# Patient Record
Sex: MALE | Employment: FULL TIME | URBAN - METROPOLITAN AREA
[De-identification: names, ages, dates, MRNs, and addresses within clinical notes are randomized per-mention and may not be internally consistent; named-entity substitution may affect disease eponyms.]

---

## 2022-01-31 ENCOUNTER — OFFICE VISIT (OUTPATIENT)
Dept: FAMILY MEDICINE | Facility: CLINIC | Age: 27
End: 2022-01-31
Payer: COMMERCIAL

## 2022-01-31 ENCOUNTER — CLINICAL SUPPORT (OUTPATIENT)
Dept: FAMILY MEDICINE | Facility: CLINIC | Age: 27
End: 2022-01-31
Payer: COMMERCIAL

## 2022-01-31 VITALS
SYSTOLIC BLOOD PRESSURE: 130 MMHG | HEART RATE: 70 BPM | BODY MASS INDEX: 27.7 KG/M2 | DIASTOLIC BLOOD PRESSURE: 82 MMHG | OXYGEN SATURATION: 99 % | HEIGHT: 66 IN | WEIGHT: 172.38 LBS | RESPIRATION RATE: 20 BRPM

## 2022-01-31 DIAGNOSIS — R79.89 ABNORMAL LEVEL OF FEMALE SEX HORMONES: ICD-10-CM

## 2022-01-31 DIAGNOSIS — Z87.718 HISTORY OF RENAL ANOMALY: ICD-10-CM

## 2022-01-31 DIAGNOSIS — R79.89 ABNORMAL LEVEL OF FEMALE SEX HORMONES: Primary | ICD-10-CM

## 2022-01-31 LAB
ALBUMIN SERPL BCP-MCNC: 4.6 G/DL (ref 3.5–5.2)
ALP SERPL-CCNC: 44 U/L (ref 55–135)
ALT SERPL W/O P-5'-P-CCNC: 18 U/L (ref 10–44)
ANION GAP SERPL CALC-SCNC: 9 MMOL/L (ref 8–16)
AST SERPL-CCNC: 19 U/L (ref 10–40)
BASOPHILS # BLD AUTO: 0.05 K/UL (ref 0–0.2)
BASOPHILS NFR BLD: 0.8 % (ref 0–1.9)
BILIRUB SERPL-MCNC: 0.6 MG/DL (ref 0.1–1)
BUN SERPL-MCNC: 16 MG/DL (ref 6–20)
CALCIUM SERPL-MCNC: 9.6 MG/DL (ref 8.7–10.5)
CHLORIDE SERPL-SCNC: 103 MMOL/L (ref 95–110)
CO2 SERPL-SCNC: 30 MMOL/L (ref 23–29)
CREAT SERPL-MCNC: 1 MG/DL (ref 0.5–1.4)
DIFFERENTIAL METHOD: ABNORMAL
EOSINOPHIL # BLD AUTO: 0.2 K/UL (ref 0–0.5)
EOSINOPHIL NFR BLD: 3.2 % (ref 0–8)
ERYTHROCYTE [DISTWIDTH] IN BLOOD BY AUTOMATED COUNT: 12.3 % (ref 11.5–14.5)
EST. GFR  (AFRICAN AMERICAN): >60 ML/MIN/1.73 M^2
EST. GFR  (NON AFRICAN AMERICAN): >60 ML/MIN/1.73 M^2
ESTRADIOL SERPL-MCNC: <10 PG/ML (ref 11–44)
FSH SERPL-ACNC: 5.43 MIU/ML (ref 0.95–11.95)
GLUCOSE SERPL-MCNC: 105 MG/DL (ref 70–110)
HCT VFR BLD AUTO: 45.8 % (ref 40–54)
HGB BLD-MCNC: 14.9 G/DL (ref 14–18)
IMM GRANULOCYTES # BLD AUTO: 0.01 K/UL (ref 0–0.04)
IMM GRANULOCYTES NFR BLD AUTO: 0.2 % (ref 0–0.5)
LH SERPL-ACNC: 2.8 MIU/ML (ref 0.6–12.1)
LYMPHOCYTES # BLD AUTO: 2 K/UL (ref 1–4.8)
LYMPHOCYTES NFR BLD: 31 % (ref 18–48)
MCH RBC QN AUTO: 31.2 PG (ref 27–31)
MCHC RBC AUTO-ENTMCNC: 32.5 G/DL (ref 32–36)
MCV RBC AUTO: 96 FL (ref 82–98)
MONOCYTES # BLD AUTO: 0.6 K/UL (ref 0.3–1)
MONOCYTES NFR BLD: 9.2 % (ref 4–15)
NEUTROPHILS # BLD AUTO: 3.5 K/UL (ref 1.8–7.7)
NEUTROPHILS NFR BLD: 55.6 % (ref 38–73)
NRBC BLD-RTO: 0 /100 WBC
PLATELET # BLD AUTO: 289 K/UL (ref 150–450)
PMV BLD AUTO: 9.3 FL (ref 9.2–12.9)
POTASSIUM SERPL-SCNC: 4.6 MMOL/L (ref 3.5–5.1)
PROT SERPL-MCNC: 7.3 G/DL (ref 6–8.4)
RBC # BLD AUTO: 4.77 M/UL (ref 4.6–6.2)
SODIUM SERPL-SCNC: 142 MMOL/L (ref 136–145)
TSH SERPL DL<=0.005 MIU/L-ACNC: 2.9 UIU/ML (ref 0.4–4)
WBC # BLD AUTO: 6.3 K/UL (ref 3.9–12.7)

## 2022-01-31 PROCEDURE — 84443 ASSAY THYROID STIM HORMONE: CPT | Performed by: FAMILY MEDICINE

## 2022-01-31 PROCEDURE — 99999 PR PBB SHADOW E&M-NEW PATIENT-LVL III: ICD-10-PCS | Mod: PBBFAC,,, | Performed by: FAMILY MEDICINE

## 2022-01-31 PROCEDURE — 99999 PR PBB SHADOW E&M-NEW PATIENT-LVL III: CPT | Mod: PBBFAC,,, | Performed by: FAMILY MEDICINE

## 2022-01-31 PROCEDURE — 83002 ASSAY OF GONADOTROPIN (LH): CPT | Performed by: FAMILY MEDICINE

## 2022-01-31 PROCEDURE — 85025 COMPLETE CBC W/AUTO DIFF WBC: CPT | Performed by: FAMILY MEDICINE

## 2022-01-31 PROCEDURE — 82670 ASSAY OF TOTAL ESTRADIOL: CPT | Performed by: FAMILY MEDICINE

## 2022-01-31 PROCEDURE — 99204 OFFICE O/P NEW MOD 45 MIN: CPT | Mod: S$GLB,,, | Performed by: FAMILY MEDICINE

## 2022-01-31 PROCEDURE — 36415 COLL VENOUS BLD VENIPUNCTURE: CPT

## 2022-01-31 PROCEDURE — 99204 PR OFFICE/OUTPT VISIT, NEW, LEVL IV, 45-59 MIN: ICD-10-PCS | Mod: S$GLB,,, | Performed by: FAMILY MEDICINE

## 2022-01-31 PROCEDURE — 80053 COMPREHEN METABOLIC PANEL: CPT | Performed by: FAMILY MEDICINE

## 2022-01-31 PROCEDURE — 83001 ASSAY OF GONADOTROPIN (FSH): CPT | Performed by: FAMILY MEDICINE

## 2022-01-31 PROCEDURE — 84402 ASSAY OF FREE TESTOSTERONE: CPT | Performed by: FAMILY MEDICINE

## 2022-01-31 NOTE — PROGRESS NOTES
Subjective:       Patient ID: Ludin Crabtree is a 27 y.o. male.    Chief Complaint: Fatigue (Concerned about hormone levels.)    Pt is a 27 y.o. male who presents for evaluation and management of   Encounter Diagnoses   Name Primary?    Estradiol excess Yes    History of renal anomaly    .pt comes to me as NP c/o fatigue and difficulty keeping muscle on despite working out and eating a high protein diet. He took it upon himself to do an online saliva test to test some hormone levels. His cortisol, DHEA, and testosterone levels were WNL for the lab reference range, but estradiol was elevated.   He takes multivitamin and a few other vitamins. Other than a high protein diet and protein shakes he does not and has not used testosterone or any other hormone products/supplements.     Doing well. On no meds. Prevention is up to date.  Review of Systems   Constitutional: Negative for fever.   HENT: Negative for congestion and sore throat.    Respiratory: Negative for cough and shortness of breath.    Cardiovascular: Negative for chest pain and leg swelling.   Gastrointestinal: Negative for anal bleeding, blood in stool, constipation, diarrhea, nausea and vomiting.   Genitourinary: Negative for dysuria.   Musculoskeletal: Negative for arthralgias, back pain, joint swelling, myalgias and neck pain.   Neurological: Positive for headaches. Negative for dizziness, seizures, syncope, weakness and light-headedness.        Occasional h/a that are relieved with OTC meds    Psychiatric/Behavioral: Negative for dysphoric mood and sleep disturbance. The patient is not nervous/anxious.        Objective:      Physical Exam  Constitutional:       Appearance: He is well-developed and well-nourished.   HENT:      Head: Normocephalic and atraumatic.      Right Ear: External ear normal.      Left Ear: External ear normal.      Nose: Nose normal.      Mouth/Throat:      Mouth: Oropharynx is clear and moist.   Eyes:      General: No  scleral icterus.        Right eye: No discharge.         Left eye: No discharge.      Extraocular Movements: EOM normal.      Conjunctiva/sclera: Conjunctivae normal.      Pupils: Pupils are equal, round, and reactive to light.   Neck:      Thyroid: No thyromegaly.      Vascular: No JVD.      Trachea: No tracheal deviation.   Cardiovascular:      Rate and Rhythm: Normal rate and regular rhythm.      Pulses: Intact distal pulses.      Heart sounds: Normal heart sounds. No murmur heard.      Pulmonary:      Effort: Pulmonary effort is normal. No respiratory distress.      Breath sounds: Normal breath sounds. No wheezing or rales.   Chest:      Chest wall: No tenderness.   Abdominal:      General: Bowel sounds are normal. There is no distension.      Palpations: Abdomen is soft. There is no mass.      Tenderness: There is no abdominal tenderness. There is no guarding or rebound.   Musculoskeletal:         General: Normal range of motion.      Cervical back: Normal range of motion and neck supple.   Lymphadenopathy:      Cervical: No cervical adenopathy.   Skin:     General: Skin is warm and dry.   Neurological:      Mental Status: He is alert and oriented to person, place, and time.      Cranial Nerves: No cranial nerve deficit.      Motor: No abnormal muscle tone.      Coordination: Coordination normal.      Deep Tendon Reflexes: Reflexes are normal and symmetric. Reflexes normal.   Psychiatric:         Mood and Affect: Mood and affect normal.         Behavior: Behavior normal.         Thought Content: Thought content normal.         Judgment: Judgment normal.         Assessment:       1. Estradiol excess    2. History of renal anomaly        Plan:   Ludin was seen today for fatigue.    Diagnoses and all orders for this visit:    Estradiol excess  -     CBC Auto Differential; Future  -     Comprehensive Metabolic Panel; Future  -     Testosterone, Free; Future  -     TSH; Future  -     FOLLICLE STIMULATING HORMONE;  "Future  -     LUTEINIZING HORMONE; Future  -     Estradiol; Future    History of renal anomaly  -     Comprehensive Metabolic Panel; Future      Problem List Items Addressed This Visit     History of renal anomaly    Overview     Unilateral kidney "blockage" as a child per pt.  Required hospitalization and resulted in reduced renal function per pt          Relevant Orders    Comprehensive Metabolic Panel      Other Visit Diagnoses     Estradiol excess    -  Primary    Relevant Orders    CBC Auto Differential    Comprehensive Metabolic Panel    Testosterone, Free    TSH    FOLLICLE STIMULATING HORMONE    LUTEINIZING HORMONE    Estradiol        No follow-ups on file.        "

## 2022-02-02 NOTE — PROGRESS NOTES
Test results normal---estrogen levels are def not elevated   Just waiting on his testosterone. Thanks

## 2022-02-04 LAB — TESTOST FREE SERPL-MCNC: 11.5 PG/ML (ref 5.1–41.5)

## 2025-01-15 ENCOUNTER — PATIENT OUTREACH (OUTPATIENT)
Dept: ADMINISTRATIVE | Facility: HOSPITAL | Age: 30
End: 2025-01-15
Payer: COMMERCIAL

## 2025-01-15 NOTE — PROGRESS NOTES
Chart reviewed, immunization record updated.  Care Everywhere updated.   Patient care coordination note  Next PCP visit -No follow up appointment is scheduled at this time   LOV with PCP 01/31/2022   Contacted patient to schedule a PCP appointment or to update patient's chart with new PCP. Patient has not been seen since 01/31/2022. Evelyn stated he has a new PCP [facility]